# Patient Record
Sex: MALE | Race: WHITE
[De-identification: names, ages, dates, MRNs, and addresses within clinical notes are randomized per-mention and may not be internally consistent; named-entity substitution may affect disease eponyms.]

---

## 2017-07-08 ENCOUNTER — HOSPITAL ENCOUNTER (EMERGENCY)
Dept: HOSPITAL 47 - EC | Age: 57
Discharge: HOME | End: 2017-07-08
Payer: COMMERCIAL

## 2017-07-08 VITALS
TEMPERATURE: 98 F | RESPIRATION RATE: 18 BRPM | DIASTOLIC BLOOD PRESSURE: 87 MMHG | HEART RATE: 92 BPM | SYSTOLIC BLOOD PRESSURE: 143 MMHG

## 2017-07-08 DIAGNOSIS — T78.49XA: Primary | ICD-10-CM

## 2017-07-08 DIAGNOSIS — Z87.891: ICD-10-CM

## 2017-07-08 PROCEDURE — 96372 THER/PROPH/DIAG INJ SC/IM: CPT

## 2017-07-08 PROCEDURE — 99283 EMERGENCY DEPT VISIT LOW MDM: CPT

## 2017-07-08 NOTE — ED
Allergic Reaction HPI





- General


Chief complaint: Allergic Reaction


Stated complaint: Poss Allergic Reaction


Time Seen by Provider: 07/08/17 20:43


Source: patient


Mode of arrival: ambulatory


Limitations: no limitations





- History of Present Illness


Initial Comments: 


56-year-old male presents with rash on bilateral arms back chest and face for 

the last few hours.  Patient states he thinks he got bit on the hand yesterday 

and a lot of itchy bumps all over.  Patient states he was at a wedding today 

had a dressing on the outside but doesn't remember getting bit by mosquitoes.  

Patient denies any recent changes to food or medications.  No recent 

antibiotics.  She denies any change of detergents.  No recent fevers.  Patient 

doesn't feel any nausea headaches or fatigue.  Patient has not taken anything 

for the rash.  Patient denies pain just itchiness.  No shortness of breath or 

trouble swallowing no lip edema or tongue swelling.





MD Complaint: allergic reaction, hives


Exposure: unknown


Symptoms: rash, itching





- Related Data


 Previous Rx's











 Medication  Instructions  Recorded


 


methylPREDNISolone Dose Pack 4 mg PO AS DIRECTED #21 package 07/08/17





[Medrol Dose Pack]  











 Allergies











Allergy/AdvReac Type Severity Reaction Status Date / Time


 


No Known Allergies Allergy   Verified 07/08/17 20:32














Review of Systems


ROS Statement: 


Those systems with pertinent positive or pertinent negative responses have been 

documented in the HPI.





ROS Other: All systems not noted in ROS Statement are negative.


Constitutional: Denies: fever, chills


ENT: Denies: throat pain


Respiratory: Denies: cough


Gastrointestinal: Denies: abdominal pain, nausea, vomiting


Skin: Reports: rash, pruritus


Neurological: Denies: headache, weakness





Past Medical History


Past Medical History: No Reported History


History of Any Multi-Drug Resistant Organisms: None Reported


Past Surgical History: Orthopedic Surgery


Additional Past Surgical History / Comment(s): Left arm - post traumatic 1984


Past Psychological History: No Psychological Hx Reported


Smoking Status: Former smoker


Past Alcohol Use History: Occasional


Past Drug Use History: None Reported





General Exam


Limitations: no limitations


General appearance: alert, in no apparent distress


Head exam: Present: atraumatic, normocephalic, normal inspection


Eye exam: Present: normal appearance, PERRL, EOMI.  Absent: scleral icterus, 

conjunctival injection, periorbital swelling


ENT exam: Present: normal exam


Neck exam: Present: normal inspection.  Absent: tenderness, meningismus, 

lymphadenopathy


Respiratory exam: Present: normal lung sounds bilaterally.  Absent: respiratory 

distress, wheezes, rales, rhonchi, stridor


Cardiovascular Exam: Present: regular rate, normal rhythm, normal heart sounds.

  Absent: systolic murmur, diastolic murmur, rubs, gallop, clicks


Neurological exam: Present: alert, oriented X3, CN II-XII intact


Psychiatric exam: Present: normal affect, normal mood


Skin exam: Present: warm, dry, intact, normal color, rash (erythematous raised 

lesions on bilateral arms and anterior posterior trunk and face. some papules 

noted.  Some urticarial lesions noted.)





Course





 Vital Signs











  07/08/17





  20:29


 


Temperature 98.0 F


 


Pulse Rate 92


 


Respiratory 18





Rate 


 


Blood Pressure 143/87


 


O2 Sat by Pulse 97





Oximetry 














Medical Decision Making





- Medical Decision Making


discussed with patient and family that seems that he's having ALLERGIC reaction 

to some sort of bug bite.  Patient to use hydrocortisone cream twice a day at 

home for one week along with Benadryl every 4-6 hours as needed for itchiness 

and swelling.  Patient takes steroids as directed as well.  Patient to return 

sooner if any problems call 911 if symptoms progress or worsen.  Patient to 

keep cool may use ice packs for itchiness as well.








Disposition


Clinical Impression: 


 Allergic reaction, Allergic reaction to insect sting





Disposition: HOME SELF-CARE


Condition: Good


Instructions:  Insect Bite or Sting (ED), Urticaria (ED)


Prescriptions: 


methylPREDNISolone Dose Pack [Medrol Dose Pack] 4 mg PO AS DIRECTED #21 package


Referrals: 


None,Stated [Primary Care Provider] - 1-2 days


Aden Can MD [REFERRING] - 1-2 days


Time of Disposition: 20:55

## 2019-11-07 ENCOUNTER — HOSPITAL ENCOUNTER (OUTPATIENT)
Dept: HOSPITAL 47 - LABPAT | Age: 59
Discharge: HOME | End: 2019-11-07
Attending: ORTHOPAEDIC SURGERY
Payer: COMMERCIAL

## 2019-11-07 DIAGNOSIS — Z01.812: ICD-10-CM

## 2019-11-07 DIAGNOSIS — Z01.818: Primary | ICD-10-CM

## 2019-11-07 DIAGNOSIS — M17.11: ICD-10-CM

## 2019-11-07 LAB
ALBUMIN SERPL-MCNC: 4.5 G/DL (ref 3.5–5)
ALP SERPL-CCNC: 73 U/L (ref 38–126)
ALT SERPL-CCNC: 48 U/L (ref 21–72)
ANION GAP SERPL CALC-SCNC: 10 MMOL/L
APTT BLD: 24.4 SEC (ref 22–30)
AST SERPL-CCNC: 35 U/L (ref 17–59)
BUN SERPL-SCNC: 25 MG/DL (ref 9–20)
CALCIUM SPEC-MCNC: 9.7 MG/DL (ref 8.4–10.2)
CHLORIDE SERPL-SCNC: 106 MMOL/L (ref 98–107)
CO2 SERPL-SCNC: 25 MMOL/L (ref 22–30)
ERYTHROCYTE [DISTWIDTH] IN BLOOD BY AUTOMATED COUNT: 5.32 M/UL (ref 4.3–5.9)
ERYTHROCYTE [DISTWIDTH] IN BLOOD: 13.8 % (ref 11.5–15.5)
GLUCOSE SERPL-MCNC: 91 MG/DL (ref 74–99)
HCT VFR BLD AUTO: 46 % (ref 39–53)
HGB BLD-MCNC: 15.6 GM/DL (ref 13–17.5)
INR PPP: 0.9 (ref ?–1.2)
MCH RBC QN AUTO: 29.3 PG (ref 25–35)
MCHC RBC AUTO-ENTMCNC: 33.9 G/DL (ref 31–37)
MCV RBC AUTO: 86.5 FL (ref 80–100)
PH UR: 5.5 [PH] (ref 5–8)
PLATELET # BLD AUTO: 228 K/UL (ref 150–450)
POTASSIUM SERPL-SCNC: 4.6 MMOL/L (ref 3.5–5.1)
PROT SERPL-MCNC: 8 G/DL (ref 6.3–8.2)
PT BLD: 9.9 SEC (ref 9–12)
SODIUM SERPL-SCNC: 141 MMOL/L (ref 137–145)
SP GR UR: 1.02 (ref 1–1.03)
UROBILINOGEN UR QL STRIP: <2 MG/DL (ref ?–2)
WBC # BLD AUTO: 5.6 K/UL (ref 3.8–10.6)

## 2019-11-07 PROCEDURE — 85610 PROTHROMBIN TIME: CPT

## 2019-11-07 PROCEDURE — 87070 CULTURE OTHR SPECIMN AEROBIC: CPT

## 2019-11-07 PROCEDURE — 36415 COLL VENOUS BLD VENIPUNCTURE: CPT

## 2019-11-07 PROCEDURE — 81003 URINALYSIS AUTO W/O SCOPE: CPT

## 2019-11-07 PROCEDURE — 85730 THROMBOPLASTIN TIME PARTIAL: CPT

## 2019-11-07 PROCEDURE — 85027 COMPLETE CBC AUTOMATED: CPT

## 2019-11-07 PROCEDURE — 93005 ELECTROCARDIOGRAM TRACING: CPT

## 2019-11-07 PROCEDURE — 80053 COMPREHEN METABOLIC PANEL: CPT

## 2019-11-19 ENCOUNTER — HOSPITAL ENCOUNTER (OUTPATIENT)
Dept: HOSPITAL 47 - OR | Age: 59
LOS: 2 days | Discharge: HOME HEALTH | End: 2019-11-21
Attending: ORTHOPAEDIC SURGERY
Payer: COMMERCIAL

## 2019-11-19 VITALS — BODY MASS INDEX: 29.4 KG/M2

## 2019-11-19 DIAGNOSIS — Z98.41: ICD-10-CM

## 2019-11-19 DIAGNOSIS — Z88.5: ICD-10-CM

## 2019-11-19 DIAGNOSIS — Z96.1: ICD-10-CM

## 2019-11-19 DIAGNOSIS — M25.761: ICD-10-CM

## 2019-11-19 DIAGNOSIS — Z87.891: ICD-10-CM

## 2019-11-19 DIAGNOSIS — I95.81: ICD-10-CM

## 2019-11-19 DIAGNOSIS — Z98.42: ICD-10-CM

## 2019-11-19 DIAGNOSIS — I10: ICD-10-CM

## 2019-11-19 DIAGNOSIS — Z79.1: ICD-10-CM

## 2019-11-19 DIAGNOSIS — M17.11: Primary | ICD-10-CM

## 2019-11-19 DIAGNOSIS — Z79.899: ICD-10-CM

## 2019-11-19 PROCEDURE — 85025 COMPLETE CBC W/AUTO DIFF WBC: CPT

## 2019-11-19 PROCEDURE — 97161 PT EVAL LOW COMPLEX 20 MIN: CPT

## 2019-11-19 PROCEDURE — 73560 X-RAY EXAM OF KNEE 1 OR 2: CPT

## 2019-11-19 PROCEDURE — 27447 TOTAL KNEE ARTHROPLASTY: CPT

## 2019-11-19 PROCEDURE — 88300 SURGICAL PATH GROSS: CPT

## 2019-11-19 PROCEDURE — 64448 NJX AA&/STRD FEM NRV NFS IMG: CPT

## 2019-11-19 PROCEDURE — 76942 ECHO GUIDE FOR BIOPSY: CPT

## 2019-11-19 RX ADMIN — POTASSIUM CHLORIDE SCH MLS: 14.9 INJECTION, SOLUTION INTRAVENOUS at 13:04

## 2019-11-19 RX ADMIN — DOCUSATE SODIUM AND SENNOSIDES SCH: 50; 8.6 TABLET ORAL at 21:15

## 2019-11-19 RX ADMIN — CEFAZOLIN SCH: 330 INJECTION, POWDER, FOR SOLUTION INTRAMUSCULAR; INTRAVENOUS at 18:31

## 2019-11-19 RX ADMIN — ASPIRIN 325 MG ORAL TABLET SCH MG: 325 PILL ORAL at 21:15

## 2019-11-19 NOTE — XR
EXAMINATION TYPE: XR knee limited RT

 

DATE OF EXAM: 11/19/2019

 

COMPARISON: NONE

 

HISTORY: Postop knee surgery

 

TECHNIQUE: 2 views

 

FINDINGS: There is right knee prosthesis. Components are in anatomic position.

 

IMPRESSION: No complicating process seen.

## 2019-11-19 NOTE — P.ANPRN
Procedure Note - Anesthesia





- Nerve Block Performed


  ** Right Adductor Canal Infusion


Time Out Performed: Yes ([id number])


Date of Procedure: 11/19/19


Procedure Start Time: 13:22


Procedure Stop Time: 13:30


Location of Patient: PreOp


Indication: Acute Post-Operative Pain, Dx/Pain Location, Requested by Surgeon


Sedation Type: Sedate with meaningful contact maintained


Preparation: Sterile Prep


Position: Supine


Catheter: Indwelling


Needle Types: Pajunk


Needle Gauge: 21


Ultrasound used to visualize needle placement: Yes


Ultrasound used to observe medication spread: Yes


Injectate: 0.5% Ropivacaine (see comment for volume) (20ml)


Blood Aspirated: No


Pain Paresthesia on Injection Noted: No


Resistance on Injection: Normal


Image Stored and Saved: Yes


Events: Uneventful and Well Tolerated

## 2019-11-19 NOTE — P.OP
Date of Procedure: 11/19/19


Preoperative Diagnosis: 


Severe osteoarthritis right knee


Postoperative Diagnosis: 


Severe osteoarthritis right knee


Procedure(s) Performed: 


Right total knee arthroplasty


Implants: 


Smith and Nephew Journey II CR Oxinium cruciate retaining femoral component size

6, right


Smith & Nephew Journey right nonporous tibial baseplate size 5


Smith & Nephew Journey II, XLPE CR articular insert, size 13 mm, Size 5-6 right


Smith & Nephew Journey BCS resurfacing oval patellar component, 32 mm


All components were cemented using Palacos R bone cement..


The articulation is Oxinium on polyethylene.


Anesthesia: spinal


Surgeon: Brice Leung


Assistant #1: Corina Mc


Estimated Blood Loss (ml): 40


Pathology: other (Bone and cartilage)


Condition: stable


Disposition: PACU


Indications for Procedure: 


After failure of conservative treatment we discussed the surgical and 

nonsurgical treatment options at length.  Patient wishes to proceed with a total

knee arthroplasty.  Complications specific to this procedure were discussed at 

length, including but not limited to infection, bleeding, stiffness, and nerve 

injury.  Patient is aware of all these complications and informed consent was 

obtained


Operative Findings: 


The operative findings are consistent with severe osteoarthritis of the right 

knee


Description of Procedure: 


Patient was seen in the preoperative area consent was reviewed and operative 

site was marked with a skin marker.  An adductor canal pain catheter was placed 

by anesthesia in the preoperative area.  Patient was then brought to the 

operating room and given preoperative antibiotics intravenously. A spinal 

anesthetic was administered by the anesthesia department.  A tourniquet was 

placed on the upper thigh and the lower extremity was prepped and draped in 

usual sterile fashion.  A gram of transexamic acid was given.  A universal 

timeout was then performed which confirmed the patient's name, surgical site, 

ALLERGIES, and consent.





The lower extremity was then exsanguinated and tourniquet was inflated to 250 

mmHg.  A standard and anterior midline approach to the knee was performed.  The 

skin and subcutaneous tissue was dissected down to the patellar tendon.  A 

medial parapatellar arthrotomy was then performed.  The knee was then extended, 

the patellar was everted, and the knee was again flexed.  Anterior horns of both

menisci were excised, and a release was performed to the posterior medial aspect

of the knee.  On gross visual inspection, there was complete loss of articular 

cartilage in the medial and patellofemoral joint spaces.  There was also 

significant cartilage damage in the lateral compartment.  There were multiple 

periarticular osteophytes which were then removed with a Ronguer.  The femoral 

canal was then opened with the appropriate drill, and the intramedullary femoral

cutting guide was then placed and set for 5 of valgus.  The distal femoral 

cutting block was then pinned in place, and the distal femur was then cut.  The 

cutting block was then removed and the cut was checked for flatness.  Next, the 

sizing guide was then placed and set for 3 external rotation based off of the 

epicondylar axis and Whitesides line.  After the femur was sized, the 

appropriate 4-in-1 cutting block was then pinned in place.  The anterior 

condyles were cut without notching.  The posterior and chamfer cuts were 

performed while protecting the collateral ligaments.  The cutting block was then

removed, and the femoral canal was plugged with autologous bone.





Attention was then directed to the tibia.  The remaining ACL was removed with a 

Ronguer, and the tibia was then gently subluxed forward with a large bent knee 

retractor.  Any remaining menisci was excised.  The posterior lateral corner was

cauterized in order to cauterize the lateral geniculate artery.  The extra 

medullary tibial cutting guide was then placed, set for the appropriate 

rotation, slope, and depth of resection.  The proximal tibia cutting guide was t

hen pinned in place.  Proximal tibia was then cut and sized.  Next trials were 

then placed with the appropriate-sized insert.  The knee was able to fully 

extend and flex to 130 and was stable throughout all range of motion.  The knee

was then extended, patella everted.  Patella was then measured, and then using 

an osteotomy guide, the patella was cut at the appropriate level.  The patella 

was then measured and drilled and the patella trial was then placed.  The knee 

was then taken through range of motion with the patella trial and the patella 

tracked normally.  The knee was then extended patella trial was then removed and

the patella was everted.  Knee was then flexed and lug holes were drilled 

through the femoral trial and the femoral trial was then removed.  The tibial 

was then exposed, and the tibial broach guide was then pinned in place after it 

was set for the appropriate rotation to allow for the most coverage without 

overhang.  The tibia was then reamed and broached.





The cut surfaces of bone were then irrigated with pulsatile lavage.  The 

posterior structures were injected with the ropivacaine solution.  The knee was 

also irrigated with Irrisept solution.  The components were then opened, the 

cement was mixed, and the components were then cemented in place.  The cement 

was allowed to harden with the knee in full extension.  While the cement was 

hardening, the remaining soft tissues were then injected with a ropivacaine 

solution, which consisted of 246.25 mg of ropivacaine, 0.5 mg of epinephrine, 30

mg of Toradol, 80 g of clonidine, and 48.45 mL of sterile water, for a total of

100 mL of fluid injected. After the cemented hardened.  The tourniquet was 

released, and hemostasis was obtained.  A second gram of transexamic acid was 

given.  The knee was again irrigated.  The knee was again taken through range of

motion and found to be stable throughout all range of motion of 0-130, and the 

patella tracked normally.  The fascia was then closed with #2 strata fix suture.

 The subcutaneous tissue was closed with 3-0 Vicryl and 3-0 strata fix.  

Dermabond glue was used for the skin and placed with the knee in flexion. The 

patient was placed in a sterile silver dressing.  Patient was then transferred 

to recovery room in stable condition.





The assistant WALTER Abbott was required due the complexity surgery and the 

need for a skilled surgical assistant.  She assisted in positioning, draping, 

retraction, and closure of the wound.

## 2019-11-20 LAB
BASOPHILS # BLD AUTO: 0 K/UL (ref 0–0.2)
BASOPHILS NFR BLD AUTO: 0 %
EOSINOPHIL # BLD AUTO: 0 K/UL (ref 0–0.7)
EOSINOPHIL NFR BLD AUTO: 0 %
ERYTHROCYTE [DISTWIDTH] IN BLOOD BY AUTOMATED COUNT: 4.55 M/UL (ref 4.3–5.9)
ERYTHROCYTE [DISTWIDTH] IN BLOOD: 13.8 % (ref 11.5–15.5)
GLUCOSE BLD-MCNC: 140 MG/DL (ref 75–99)
HCT VFR BLD AUTO: 39.4 % (ref 39–53)
HGB BLD-MCNC: 13.3 GM/DL (ref 13–17.5)
LYMPHOCYTES # SPEC AUTO: 1.1 K/UL (ref 1–4.8)
LYMPHOCYTES NFR SPEC AUTO: 8 %
MCH RBC QN AUTO: 29.3 PG (ref 25–35)
MCHC RBC AUTO-ENTMCNC: 33.8 G/DL (ref 31–37)
MCV RBC AUTO: 86.5 FL (ref 80–100)
MONOCYTES # BLD AUTO: 0.7 K/UL (ref 0–1)
MONOCYTES NFR BLD AUTO: 5 %
NEUTROPHILS # BLD AUTO: 12.2 K/UL (ref 1.3–7.7)
NEUTROPHILS NFR BLD AUTO: 86 %
PLATELET # BLD AUTO: 203 K/UL (ref 150–450)
WBC # BLD AUTO: 14.2 K/UL (ref 3.8–10.6)

## 2019-11-20 RX ADMIN — POTASSIUM CHLORIDE SCH: 14.9 INJECTION, SOLUTION INTRAVENOUS at 20:52

## 2019-11-20 RX ADMIN — HYDROCODONE BITARTRATE AND ACETAMINOPHEN PRN EACH: 5; 325 TABLET ORAL at 05:00

## 2019-11-20 RX ADMIN — HYDROCODONE BITARTRATE AND ACETAMINOPHEN PRN EACH: 5; 325 TABLET ORAL at 19:14

## 2019-11-20 RX ADMIN — MELOXICAM SCH MG: 7.5 TABLET ORAL at 08:42

## 2019-11-20 RX ADMIN — ASPIRIN 325 MG ORAL TABLET SCH MG: 325 PILL ORAL at 20:04

## 2019-11-20 RX ADMIN — HYDROCODONE BITARTRATE AND ACETAMINOPHEN PRN EACH: 5; 325 TABLET ORAL at 11:27

## 2019-11-20 RX ADMIN — ASPIRIN 325 MG ORAL TABLET SCH MG: 325 PILL ORAL at 08:43

## 2019-11-20 RX ADMIN — CEFAZOLIN SCH: 330 INJECTION, POWDER, FOR SOLUTION INTRAMUSCULAR; INTRAVENOUS at 05:57

## 2019-11-20 RX ADMIN — CEFAZOLIN SCH: 330 INJECTION, POWDER, FOR SOLUTION INTRAMUSCULAR; INTRAVENOUS at 19:40

## 2019-11-20 RX ADMIN — POTASSIUM CHLORIDE SCH: 14.9 INJECTION, SOLUTION INTRAVENOUS at 05:57

## 2019-11-20 RX ADMIN — DOCUSATE SODIUM AND SENNOSIDES SCH EACH: 50; 8.6 TABLET ORAL at 20:04

## 2019-11-20 NOTE — P.PN
Progress Note - Text


Progress Note Date: 11/20/19


58-year-old male status post right total knee arthroplasty postop day #1, 

adductor canal catheter day #2.  VAS between a 2-4 out of 10 in severity.  

Patient ambulating without issue.  Incentive spirometry at bedside.  Plan is to 

discharge home today.

## 2019-11-20 NOTE — P.CONS
History of Present Illness





- Reason for Consult


Leukocytosis





- History of Present Illness


58-year-old pleasant male was admitted for right knee arthrodesis x-ray 

underwent surgery patient doesn't have it drained patient did pass gas, patient 

is clinically doing well.  Is well-controlled patient any cough runny nose 

patient denied any dysuria.  Patient doesn't have a Robison catheter at this time.








Review of Systems








REVIEW OF SYSTEMS: 


CONSTITUTIONAL: No fever, no malaise, no fatigue. 


HEENT: No recent visual problems or hearing problems. Denied any sore throat. 


CARDIOVASCULAR: No chest pain, orthopnea, PND, no palpitations, no syncope. 


PULMONARY: No shortness of breath, no cough, no hemoptysis. 


GASTROINTESTINAL: No diarrhea, no nausea, no vomiting, no abdominal pain. 


NEUROLOGICAL: No headaches, no weakness, no numbness. 


HEMATOLOGICAL: Denies any bleeding or petechiae. 


GENITOURINARY: Denies any burning micturition, frequency, or urgency. 


MUSCULOSKELETAL/RHEUMATOLOGICAL: Denies any joint pain, swelling, or any muscle 

pain. 


ENDOCRINE: Denies any polyuria or polydipsia. 





The rest of the 14-point review of systems is negative.











Past Medical History


Past Medical History: Hypertension


Additional Past Medical History / Comment(s): has been on a 30 day trial of rx 

for HTN


History of Any Multi-Drug Resistant Organisms: None Reported


Past Surgical History: Orthopedic Surgery


Additional Past Surgical History / Comment(s): Left arm pins 1984, now removed, 

yamileth cataract with lens implant


Past Anesthesia/Blood Transfusion Reactions: No Reported Reaction


Past Psychological History: No Psychological Hx Reported


Smoking Status: Former smoker


Past Alcohol Use History: Occasional


Additional Past Alcohol Use History / Comment(s): quit smokin 2007, smoked less 

than 1 ppd from age 18


Past Drug Use History: None Reported





- Past Family History


  ** Mother


Family Medical History: Cancer





  ** Father


Family Medical History: Cancer





Medications and Allergies


                                Home Medications











 Medication  Instructions  Recorded  Confirmed  Type


 


Ibuprofen [Motrin] 600 mg PO Q8HR PRN 11/14/19 11/19/19 History


 


amLODIPine [Norvasc] 5 mg PO DAILY 11/14/19 11/14/19 History


 


Aspirin 325 mg PO BID #60 tab 11/20/19  Rx


 


HYDROcodone/APAP 5-325MG [Norco 1 - 2 tab PO Q6HR PRN #56 tab 11/20/19  Rx





5-325]    


 


Sennosides [Senokot] 1 tab PO BID #60 tablet 11/20/19  Rx








                                    Allergies











Allergy/AdvReac Type Severity Reaction Status Date / Time


 


codeine AdvReac  "made me Verified 11/19/19 12:37





   jittery"  














Physical Exam


Vitals: 


                                   Vital Signs











  Temp Pulse Pulse Resp BP Pulse Ox


 


 11/20/19 07:20  97.4 F L   76  17  114/78  94 L


 


 11/20/19 01:10  98.0 F   74  18  114/69  95


 


 11/19/19 19:05    99  18  141/88  95


 


 11/19/19 18:50    104 H  18  156/94  98


 


 11/19/19 18:35    89  18  146/84  98


 


 11/19/19 18:20   89  89  18  159/100  97


 


 11/19/19 18:07    87  18  143/88  96


 


 11/19/19 17:53   84  84  18  150/87  98


 


 11/19/19 17:37   67  67  18  164/93  96


 


 11/19/19 17:22  97.7 F  65  65  18  148/89  97


 


 11/19/19 17:00   61   16  132/75  96


 


 11/19/19 16:45   65   16  119/64  96


 


 11/19/19 16:30   70   16  135/73  96


 


 11/19/19 16:15   62   16  126/68  95


 


 11/19/19 16:00   60   16  125/72  96


 


 11/19/19 15:48  96.8 F L  68   16  123/71  98








                                Intake and Output











 11/19/19 11/20/19 11/20/19





 22:59 06:59 14:59


 


Intake Total 677.5  480


 


Output Total 340 500 


 


Balance 337.5 -500 480


 


Intake:   


 


    


 


  Intake, IV Titration 227.5  





  Amount   


 


    Sodium Chloride 0.9% 1, 227.5  





    000 ml @ 65 mls/hr IV .   





    [id number] [facility] Rx#:[id number]   


 


  Oral 300  480


 


Output:   


 


  Urine 300 500 


 


  Estimated Blood Loss 40  


 


Other:   


 


  Voiding Method   Toilet


 


  # Voids  1 

















PHYSICAL EXAMINATION: 





GENERAL: The patient is alert and oriented x3, not in any acute distress. Well 

developed, well nourished. 


HEENT: Pupils are round and equally reacting to light. EOMI. No scleral icterus.

 No conjunctival pallor. Normocephalic, atraumatic. No pharyngeal erythema. No 

thyromegaly. 


CARDIOVASCULAR: S1 and S2 present. No murmurs, rubs, or gallops. 


PULMONARY: Chest is clear to auscultation, no wheezing or crackles. 


ABDOMEN: Soft, nontender, nondistended, normoactive bowel sounds. No palpable 

organomegaly. 


MUSCULOSKELETAL: No joint swelling or deformity.  Right knee was postsurgical 

packed with some swelling


EXTREMITIES: No cyanosis, clubbing, or pedal edema. 


NEUROLOGICAL: Gross neurological examination did not reveal any focal deficits. 


SKIN: No rashes. 

















Results


CBC & Chem 7: 


                                 11/20/19 07:04





Labs: 


                  Abnormal Lab Results - Last 24 Hours (Table)











  11/20/19 11/20/19 Range/Units





  07:04 13:41 


 


WBC  14.2 H   (3.8-10.6)  k/uL


 


Neutrophils #  12.2 H   (1.3-7.7)  k/uL


 


POC Glucose (mg/dL)   140 H  (75-99)  mg/dL














Assessment and Plan


Plan: 


Leukocytosis: Reactive without any signs or symptoms of infection no further 

intervention is necessary patient can be discharged from medical perspective.


-Hypertension patient is on amlodipine which can be continued upon discharge


-Right knee osteoarthritis status post right knee arthroplasty pain management 

and due to prophylaxis per primary service

## 2019-11-20 NOTE — P.DS
Providers


Expected date of discharge: 11/20/19


Attending physician: 


Brice Leung





Consults: 





                                        





11/19/19 13:40


Consult Physician Routine 


   Consulting Provider: Michelle Regalado


   Consult Reason/Comments: medical management


   Do you want consulting provider notified?: Yes











Primary care physician: 


Juan José Dalybal








- Discharge Diagnosis(es)


(1) Osteoarthritis of right knee


Current Visit: Yes   Status: Acute   





(2) Status post total right knee replacement


Current Visit: Yes   Status: Acute   


Hospital Course: 


This is a 58-year-old male with known history of degenerative arthritis of the 

right knee.  The patient presents for evaluation.  After discussion and conside

ration patient elects to proceed with total knee arthroplasty.  The patient is 

seen preoperatively by Dr. Leung and medically cleared for surgery by their 

primary care physician.





Patient is admitted to [facility] 11/19/2019  for total knee 

arthroplasty.  The procedures performed without complication or sequelae.  The 

patient is doing well postoperatively.  Labs and vital signs are stable on day 

of discharge.





On day of discharge patient's knee incision is healing well.  There is minimal 

erythema.  There is no drainage noted at this time.  There is minimal soft 

tissue swelling to the knee.  Patient has full foot and ankle motion without 

difficulty or pain.  Calf is soft and nontender to palpation.  Neurovascular 

status to the right lower extremity is intact.  Patient is discharged home in 

good condition. Opioid start talking form is reviewed and signed at patient 

beside. Please see med rec for accurate list of home medications.








Plan - Discharge Summary


Discharge Rx Participant: Yes


New Discharge Prescriptions: 


New


   Aspirin 325 mg PO BID #60 tab


   HYDROcodone/APAP 5-325MG [Norco 5-325] 1 - 2 tab PO Q6HR PRN #56 tab


     PRN Reason: Pain


   Sennosides [Senokot] 1 tab PO BID #60 tablet





No Action


   amLODIPine [Norvasc] 5 mg PO DAILY


   Ibuprofen [Motrin] 600 mg PO Q8HR PRN


     PRN Reason: Pain


Discharge Medication List





Ibuprofen [Motrin] 600 mg PO Q8HR PRN 11/14/19 [History]


amLODIPine [Norvasc] 5 mg PO DAILY 11/14/19 [History]


Aspirin 325 mg PO BID #60 tab 11/20/19 [Rx]


HYDROcodone/APAP 5-325MG [Norco 5-325] 1 - 2 tab PO Q6HR PRN #56 tab 11/20/19 

[Rx]


Sennosides [Senokot] 1 tab PO BID #60 tablet 11/20/19 [Rx]








Follow up Appointment(s)/Referral(s): 


Brice Leung DO [Doctor of Osteopathic Medicine] - 2 Weeks


Activity/Diet/Wound Care/Special Instructions: 


Weightbearing as tolerated with a walker.


CPM 5-6h daily.


Leave dressing intact.  May be removed by home care nurse or by patient in 10 

days.


May shower with dressing on.


Recommend use of compression stockings daily for at least 2 weeks during the day

to help prevent swelling and blood clots. May remove at night before sleeping. 


Please follow up with [facility] and call with any questions or graciela

rns, [phone].





Discharge Disposition: HOME WITH HOME HEALTH SERVICES

## 2019-11-21 VITALS — HEART RATE: 87 BPM | TEMPERATURE: 98.1 F | RESPIRATION RATE: 17 BRPM

## 2019-11-21 VITALS — DIASTOLIC BLOOD PRESSURE: 70 MMHG | SYSTOLIC BLOOD PRESSURE: 111 MMHG

## 2019-11-21 RX ADMIN — ASPIRIN 325 MG ORAL TABLET SCH MG: 325 PILL ORAL at 07:33

## 2019-11-21 RX ADMIN — MELOXICAM SCH MG: 7.5 TABLET ORAL at 07:32

## 2019-11-21 RX ADMIN — HYDROCODONE BITARTRATE AND ACETAMINOPHEN PRN EACH: 5; 325 TABLET ORAL at 07:33

## 2019-11-21 RX ADMIN — HYDROCODONE BITARTRATE AND ACETAMINOPHEN PRN EACH: 5; 325 TABLET ORAL at 00:48

## 2019-11-21 NOTE — P.PN
Subjective


Progress Note Date: 11/21/19


This is a 58-year-old male who is status post right total knee arthroplasty.  

This is postoperative day #2 and patient is seen and evaluated at bedside.  

Patient states that yesterday when he would stand up to walk he felt dizzy.  

Patient states that he has not had any dizziness this morning and is trying to 

sit in a chair more versus laying down.  Patient states that his pain is well 

controlled. Patient denies any fever/chills, numbness, weakness, tingling, 

abdominal pain, shortness of breath or chest pain.








Objective





- Vital Signs


Vital signs: 


                                   Vital Signs











Temp  98.1 F   11/21/19 06:57


 


Pulse  87   11/21/19 06:57


 


Resp  17   11/21/19 06:57


 


BP  137/82   11/21/19 06:57


 


Pulse Ox  94 L  11/21/19 06:57








                                 Intake & Output











 11/20/19 11/21/19 11/21/19





 18:59 06:59 18:59


 


Intake Total 1570  


 


Output Total  100 


 


Balance 1570 -100 


 


Intake:   


 


  Intake, IV Titration 50  





  Amount   


 


    ceFAZolin 2 gm In Sodium 50  





    Chloride 0.9% 50 ml @ 100   





    mls/hr IVPB Q8HR [facility] Rx#   





    :[id number]   


 


  Oral 1520  


 


Output:   


 


  Urine  100 


 


Other:   


 


  Voiding Method Toilet Toilet 


 


  # Voids  1 














- Exam


Vital signs are stable.  Patient is in no acute distress and is alert and 

oriented 3.  Calf is soft and nontender to palpation.  Dressing is clean, dry, 

and intact.  Patient has full foot and ankle motion without pain or difficulty. 

Neurovascular status and circulatory status are intact.  








- Labs


CBC & Chem 7: 


                                 11/20/19 07:04





Labs: 


                  Abnormal Lab Results - Last 24 Hours (Table)











  11/20/19 11/20/19 Range/Units





  07:04 13:41 


 


WBC  14.2 H   (3.8-10.6)  k/uL


 


Neutrophils #  12.2 H   (1.3-7.7)  k/uL


 


POC Glucose (mg/dL)   140 H  (75-99)  mg/dL














Assessment and Plan


(1) Osteoarthritis of right knee


Current Visit: Yes   Status: Acute   Code(s): M17.11 - UNILATERAL PRIMARY 

OSTEOARTHRITIS, RIGHT KNEE   SNOMED Code(s): 710659959611630


   





(2) Status post total right knee replacement


Current Visit: Yes   Status: Acute   Code(s): Z96.651 - PRESENCE OF RIGHT 

ARTIFICIAL KNEE JOINT   SNOMED Code(s): 2531437048769


   


Plan: 


#1 Continue with routine postoperative care and pain control, leave dressing in 

place for ten days.


#2 Anticoagulation with aspirin.  


#3 Physical therapy and CPM today. 


#4 Appreciate input from medicine. 


#5 Will continue to monitor blood pressure today.  Patient has been asymptomatic

so far this morning.  Vital signs are stable this morning.


#6 Anticipate discharge home with home care in the next 24-48 hours.

## 2019-11-24 ENCOUNTER — HOSPITAL ENCOUNTER (INPATIENT)
Dept: HOSPITAL 47 - EC | Age: 59
LOS: 2 days | Discharge: HOME HEALTH | DRG: 561 | End: 2019-11-26
Attending: ORTHOPAEDIC SURGERY | Admitting: ORTHOPAEDIC SURGERY
Payer: COMMERCIAL

## 2019-11-24 DIAGNOSIS — I10: ICD-10-CM

## 2019-11-24 DIAGNOSIS — T84.84XA: Primary | ICD-10-CM

## 2019-11-24 DIAGNOSIS — Z88.5: ICD-10-CM

## 2019-11-24 DIAGNOSIS — Z79.82: ICD-10-CM

## 2019-11-24 DIAGNOSIS — Z96.651: ICD-10-CM

## 2019-11-24 DIAGNOSIS — Z87.891: ICD-10-CM

## 2019-11-24 LAB
ALBUMIN SERPL-MCNC: 3.5 G/DL (ref 3.5–5)
ALP SERPL-CCNC: 75 U/L (ref 38–126)
ALT SERPL-CCNC: 57 U/L (ref 21–72)
ANION GAP SERPL CALC-SCNC: 7 MMOL/L
AST SERPL-CCNC: 53 U/L (ref 17–59)
BASOPHILS # BLD AUTO: 0.1 K/UL (ref 0–0.2)
BASOPHILS NFR BLD AUTO: 1 %
BUN SERPL-SCNC: 15 MG/DL (ref 9–20)
CALCIUM SPEC-MCNC: 8.9 MG/DL (ref 8.4–10.2)
CHLORIDE SERPL-SCNC: 104 MMOL/L (ref 98–107)
CO2 SERPL-SCNC: 29 MMOL/L (ref 22–30)
EOSINOPHIL # BLD AUTO: 0.2 K/UL (ref 0–0.7)
EOSINOPHIL NFR BLD AUTO: 3 %
ERYTHROCYTE [DISTWIDTH] IN BLOOD BY AUTOMATED COUNT: 3.67 M/UL (ref 4.3–5.9)
ERYTHROCYTE [DISTWIDTH] IN BLOOD: 13.9 % (ref 11.5–15.5)
GLUCOSE SERPL-MCNC: 104 MG/DL (ref 74–99)
HCT VFR BLD AUTO: 32.1 % (ref 39–53)
HGB BLD-MCNC: 10.7 GM/DL (ref 13–17.5)
LYMPHOCYTES # SPEC AUTO: 1.1 K/UL (ref 1–4.8)
LYMPHOCYTES NFR SPEC AUTO: 15 %
MCH RBC QN AUTO: 29.1 PG (ref 25–35)
MCHC RBC AUTO-ENTMCNC: 33.3 G/DL (ref 31–37)
MCV RBC AUTO: 87.6 FL (ref 80–100)
MONOCYTES # BLD AUTO: 0.4 K/UL (ref 0–1)
MONOCYTES NFR BLD AUTO: 6 %
NEUTROPHILS # BLD AUTO: 5.5 K/UL (ref 1.3–7.7)
NEUTROPHILS NFR BLD AUTO: 74 %
PLATELET # BLD AUTO: 258 K/UL (ref 150–450)
POTASSIUM SERPL-SCNC: 4.3 MMOL/L (ref 3.5–5.1)
PROT SERPL-MCNC: 6.4 G/DL (ref 6.3–8.2)
SODIUM SERPL-SCNC: 140 MMOL/L (ref 137–145)
WBC # BLD AUTO: 7.4 K/UL (ref 3.8–10.6)

## 2019-11-24 PROCEDURE — 87040 BLOOD CULTURE FOR BACTERIA: CPT

## 2019-11-24 PROCEDURE — 83605 ASSAY OF LACTIC ACID: CPT

## 2019-11-24 PROCEDURE — 80053 COMPREHEN METABOLIC PANEL: CPT

## 2019-11-24 PROCEDURE — 99284 EMERGENCY DEPT VISIT MOD MDM: CPT

## 2019-11-24 PROCEDURE — 96365 THER/PROPH/DIAG IV INF INIT: CPT

## 2019-11-24 PROCEDURE — 36415 COLL VENOUS BLD VENIPUNCTURE: CPT

## 2019-11-24 PROCEDURE — 96375 TX/PRO/DX INJ NEW DRUG ADDON: CPT

## 2019-11-24 PROCEDURE — 85025 COMPLETE CBC W/AUTO DIFF WBC: CPT

## 2019-11-24 RX ADMIN — CEFAZOLIN SCH MLS/HR: 330 INJECTION, POWDER, FOR SOLUTION INTRAMUSCULAR; INTRAVENOUS at 19:27

## 2019-11-24 RX ADMIN — KETOROLAC TROMETHAMINE PRN MG: 30 INJECTION, SOLUTION INTRAMUSCULAR at 16:01

## 2019-11-24 RX ADMIN — HYDROCODONE BITARTRATE AND ACETAMINOPHEN PRN EACH: 7.5; 325 TABLET ORAL at 19:26

## 2019-11-24 RX ADMIN — CEFAZOLIN SCH MLS/HR: 330 INJECTION, POWDER, FOR SOLUTION INTRAMUSCULAR; INTRAVENOUS at 11:22

## 2019-11-24 NOTE — ED
Recheck HPI





<Meet Tom - Last Filed: 11/24/19 14:55>





- General


Source: patient


Mode of arrival: wheelchair


Limitations: no limitations





<Diana Zimmerman - Last Filed: 11/24/19 15:21>





- General


Chief Complaint: Recheck/Abnormal Lab/Rx


Stated Complaint: complications from surgery


Time Seen by Provider: 11/24/19 10:26





- History of Present Illness


Initial Comments: 


58-year-old male presenting today for chief complaint of left leg redness 

swelling.  Increasing pain.  Patient states that he had a total knee replacement

on 1119 by Dr. jose c jones.  He states he was discharged on the 21st.  Patient 

states that he had the pain home that was located locally removed on Friday.  

Patient states that over the weekend he had increasing pain and redness and 

swelling.  Patient states the redness now has trouble from the area of the 

medial aspect of the right knee towards his inner groin.  Patient states he is 

not able to weight bear as much to use 2 secondary to discomfort.  Patient 

states his pain medications at home and her longer working.  Denies fever or 

flulike symptoms. Denies chest pain shortness of breath.  Patient denies a 

diffuse redness of the joint aren't purulent drainage remaining review of 

systems negative upon arrival patient appears well and nontoxic.


 (Diana Zimmerman)





- Related Data


                                  Previous Rx's











 Medication  Instructions  Recorded


 


Aspirin 325 mg PO BID #60 tab 11/20/19


 


HYDROcodone/APAP 5-325MG [Norco 1 - 2 tab PO Q6HR PRN #56 tab 11/20/19





5-325]  


 


Sennosides [Senokot] 1 tab PO BID #60 tablet 11/20/19











                                    Allergies











Allergy/AdvReac Type Severity Reaction Status Date / Time


 


codeine AdvReac  "made me Verified 11/24/19 15:15





   jittery"  














Review of Systems


ROS Other: All systems not noted in ROS Statement are negative.





<Meet Tom - Last Filed: 11/24/19 14:55>


ROS Other: All systems not noted in ROS Statement are negative.





<Diana Zimmerman - Last Filed: 11/24/19 15:21>


ROS Statement: 


Those systems with pertinent positive or pertinent negative responses have been 

documented in the HPI.








Past Medical History


Past Medical History: No Reported History


History of Any Multi-Drug Resistant Organisms: None Reported


Past Surgical History: Orthopedic Surgery


Additional Past Surgical History / Comment(s): Left arm - post traumatic 1984, 

RT total knee replacement


Past Psychological History: No Psychological Hx Reported


Smoking Status: Former smoker


Past Alcohol Use History: Occasional


Past Drug Use History: None Reported





<Diana Zimmerman - Last Filed: 11/24/19 15:21>





General Exam


Limitations: no limitations





<Diana Zimmerman - Last Filed: 11/24/19 15:21>





- General Exam Comments


Initial Comments: 


General:  The patient is awake and alert, in no distress, and does not appear 

acutely ill. 


Eye:  Pupils are equal, round and reactive to light, extra-ocular movements are 

intact.  No nystagmus.  There is normal conjunctiva bilaterally.  No signs of 

icterus.  


Ears, nose, mouth and throat:  There are moist mucous membranes and no oral 

lesions. 


Neck:  The neck is supple, there is no tenderness or JVD.  


Cardiovascular:  There is a regular rate and rhythm. No murmur, rub or gallop is

 appreciated.


Respiratory:  Lungs are clear to auscultation, respirations are non-labored, 

breath sounds are equal.  No wheezes, stridor, rales, or rhonchi.


Musculoskeletal:  Upon inspection of the knee there is a midline incision.  

Serosanguineous drainage on the bandage.  No purulent drainage.  No dehiscence. 

 Patient is able to slightly range at the right knee there is swelling and 

bruising consistent recent operative status.  Patient does have significant 

redness that tracks towards the head greater than 15" x 4".  Warm to palpation 

no evidence of abscess Sensation intact. DP pulses equal bilaterally 2+.  No 

calf pain negative Homans.


Neurological:  A&O x 3. CN II-XII intact, There are no obvious motor or sensory 

deficits. Coordination appears grossly intact. Speech is normal.


Skin:  Skin is warm and dry and no rashes or lesions are noted. 


Psychiatric:  Cooperative, appropriate mood & affect, normal judgment.  


 (Diana Zimmerman)





Course





<Meet Tom - Last Filed: 11/24/19 14:55>


                                   Vital Signs











  11/24/19 11/24/19





  10:19 12:21


 


Temperature 97.9 F 


 


Pulse Rate 108 H 103 H


 


Respiratory 18 18





Rate  


 


Blood Pressure 115/72 153/93


 


O2 Sat by Pulse 100 98





Oximetry  














- Reevaluation(s)


Reevaluation #1: 





11/24/19 14:55


PA supervision: I proceeded face-to-face evaluation the patient.  He is postop 

knee replacement.  He does present with complaints of chills as well as pain to 

the medial aspect of the knee with erythema ascending to the groin area.  Lab 

work is unremarkable however the patient's symptoms are discordant.  Patient 

does have erythema with elevated localized temperature to this area as well as 

tenderness palpation ultrasound was negative for DVT.  Attempts are made it any 

bleeding patient has he had been doing postop this is unsuccessful patient be 

admitted I did discuss case with Dr. Sutton.  Ancef every 8 hours IV will be 

started. (Meet Tom)





Medical Decision Making





- Lab Data


Result diagrams: 


                                 11/24/19 11:00





                                 11/24/19 11:00





<Meet Tom - Last Filed: 11/24/19 14:55>





- Lab Data


Result diagrams: 


                                 11/24/19 11:00





                                 11/24/19 11:00





<Diana Zimmerman - Last Filed: 11/24/19 15:21>





- Medical Decision Making


8-year-old male presenting for increasing right knee pain after surgery.  

Physical exam findings consistent with cellulitis.  This does not appear to be 

us a septic joint at this time.  No leukocytosis patient afebrile nontoxic 

appearing however given the extensiveness of this cellulitis with close 

proximity to patient's recent surgical site I feel admission is appropriate.  

Dr. Sutton was consult.  Initially he recommended discharge.  However pain did 

not resolve and he was re-consult and he recommended admission with every 8 

hours Ancef. Dr. Tom evaluated patient agreeable with disposition and care 

plan. Transferred to floor in stable condition. Blood cultures pending.


 (Diana Zimmerman)





- Lab Data


                                   Lab Results











  11/24/19 11/24/19 11/24/19 Range/Units





  11:00 11:00 11:00 


 


WBC  7.4    (3.8-10.6)  k/uL


 


RBC  3.67 L    (4.30-5.90)  m/uL


 


Hgb  10.7 L    (13.0-17.5)  gm/dL


 


Hct  32.1 L    (39.0-53.0)  %


 


MCV  87.6    (80.0-100.0)  fL


 


MCH  29.1    (25.0-35.0)  pg


 


MCHC  33.3    (31.0-37.0)  g/dL


 


RDW  13.9    (11.5-15.5)  %


 


Plt Count  258    (150-450)  k/uL


 


Neutrophils %  74    %


 


Lymphocytes %  15    %


 


Monocytes %  6    %


 


Eosinophils %  3    %


 


Basophils %  1    %


 


Neutrophils #  5.5    (1.3-7.7)  k/uL


 


Lymphocytes #  1.1    (1.0-4.8)  k/uL


 


Monocytes #  0.4    (0-1.0)  k/uL


 


Eosinophils #  0.2    (0-0.7)  k/uL


 


Basophils #  0.1    (0-0.2)  k/uL


 


Sodium   140   (137-145)  mmol/L


 


Potassium   4.3   (3.5-5.1)  mmol/L


 


Chloride   104   ()  mmol/L


 


Carbon Dioxide   29   (22-30)  mmol/L


 


Anion Gap   7   mmol/L


 


BUN   15   (9-20)  mg/dL


 


Creatinine   1.02   (0.66-1.25)  mg/dL


 


Est GFR (CKD-EPI)AfAm   >90   (>60 ml/min/1.73 sqM)  


 


Est GFR (CKD-EPI)NonAf   81   (>60 ml/min/1.73 sqM)  


 


Glucose   104 H   (74-99)  mg/dL


 


Plasma Lactic Acid Will    1.5  (0.7-2.0)  mmol/L


 


Calcium   8.9   (8.4-10.2)  mg/dL


 


Total Bilirubin   0.6   (0.2-1.3)  mg/dL


 


AST   53   (17-59)  U/L


 


ALT   57   (21-72)  U/L


 


Alkaline Phosphatase   75   ()  U/L


 


Total Protein   6.4   (6.3-8.2)  g/dL


 


Albumin   3.5   (3.5-5.0)  g/dL














Disposition





<Meet Tom - Last Filed: 11/24/19 14:55>


Is patient prescribed a controlled substance at d/c from ED?: No


Time of Disposition: 12:11


Decision to Admit Reason: Admit from EC


Decision Date: 11/24/19


Decision Time: 14:55





<Diana Zimmerman - Last Filed: 11/24/19 15:21>


Clinical Impression: 


 Surgical site infection, Cellulitis





Disposition: ADMITTED AS IP TO THIS HOSP


Condition: Stable


Additional Instructions: 


.


Referrals: 


Aden Can MD [Primary Care Provider] - 1-2 days


Brice Leung DO [Doctor of Osteopathic Medicine] - 1-2 days

## 2019-11-24 NOTE — US
EXAMINATION TYPE: US venous doppler duplex LE RT

 

DATE OF EXAM: 11/24/2019 11:36 AM

 

COMPARISON: NONE

 

CLINICAL HISTORY: 58-year-old male r/o blood clot. Right total knee replacement 11/19/19. Pain right 
leg. Edema right ankle

 

SIDE PERFORMED: right  

 

TECHNIQUE:  The lower extremity deep venous system is examined utilizing real time linear array sonog
nevin with graded compression, doppler sonography and color-flow sonography.

 

FINDINGS:

 

VESSELS IMAGED:

External Iliac Vein (EIV)

Common Femoral Vein

Deep Femoral Vein

Greater Saphenous Vein *

Femoral Vein

Popliteal Vein

Small Saphenous Vein *

Proximal Calf Veins

(* superficial vessels)

 

 

 

Right Leg:  No evidence of DVT as visualized

 

 

 

 

IMPRESSION:

No evidence for DVT within the right lower extremity imaged from the groin to the upper calf.

## 2019-11-25 RX ADMIN — KETOROLAC TROMETHAMINE PRN MG: 30 INJECTION, SOLUTION INTRAMUSCULAR at 19:16

## 2019-11-25 RX ADMIN — HYDROCODONE BITARTRATE AND ACETAMINOPHEN PRN EACH: 7.5; 325 TABLET ORAL at 14:27

## 2019-11-25 RX ADMIN — CEFAZOLIN SCH MLS/HR: 330 INJECTION, POWDER, FOR SOLUTION INTRAMUSCULAR; INTRAVENOUS at 19:17

## 2019-11-25 RX ADMIN — CEFAZOLIN SCH MLS/HR: 330 INJECTION, POWDER, FOR SOLUTION INTRAMUSCULAR; INTRAVENOUS at 08:21

## 2019-11-25 RX ADMIN — KETOROLAC TROMETHAMINE PRN MG: 30 INJECTION, SOLUTION INTRAMUSCULAR at 00:09

## 2019-11-25 RX ADMIN — HYDROCODONE BITARTRATE AND ACETAMINOPHEN PRN EACH: 7.5; 325 TABLET ORAL at 21:08

## 2019-11-25 RX ADMIN — KETOROLAC TROMETHAMINE PRN MG: 30 INJECTION, SOLUTION INTRAMUSCULAR at 13:08

## 2019-11-25 RX ADMIN — HYDROCODONE BITARTRATE AND ACETAMINOPHEN PRN EACH: 7.5; 325 TABLET ORAL at 03:16

## 2019-11-25 RX ADMIN — HYDROCODONE BITARTRATE AND ACETAMINOPHEN PRN EACH: 7.5; 325 TABLET ORAL at 08:20

## 2019-11-25 NOTE — P.HPOR
History of Present Illness


H&P Date: 11/25/19


This is a 58-year-old male who was admitted for right knee pain and concern for 

surgical site infection.  Patient presented to the emergency room on 11/24/2019 

for pain and swelling in the right lower extremity.  Patient is status post 

right total knee arthroplasty on 11/19/2019 by  Dr. Brice Leung.  Patient 

states that on 11/24/2019 he developed pain in the right lower extremity and had

difficulty bearing weight on the right leg.  Patient states that his pain med

ication has not been helping.  Patient states that he has been doing his 

physical therapy exercises.  Patient states that he did notice some redness 

around the right knee yesterday, but this has improved.  Patient denies any 

fever or chills, numbness, weakness or tingling.








Review of Systems


See HPI.








Past Medical History


Past Medical History: Hypertension


Additional Past Medical History / Comment(s): recently diagnoses, not currently 

taking medications


History of Any Multi-Drug Resistant Organisms: None Reported


Past Surgical History: Orthopedic Surgery


Additional Past Surgical History / Comment(s): Left arm - post traumatic 1984, 

RT total knee replacement, cataract replacement


Past Psychological History: No Psychological Hx Reported


Smoking Status: Former smoker


Past Alcohol Use History: Occasional


Past Drug Use History: None Reported





- Past Family History


  ** Mother


Family Medical History: Cancer





Medications and Allergies


                                Home Medications











 Medication  Instructions  Recorded  Confirmed  Type


 


Aspirin 325 mg PO BID #60 tab 11/20/19 11/24/19 Rx


 


HYDROcodone/APAP 5-325MG [Norco 1 - 2 tab PO Q6HR PRN #56 tab 11/20/19 11/24/19 

Rx





5-325]    


 


Sennosides [Senokot] 1 tab PO BID #60 tablet 11/20/19 11/24/19 Rx








                                    Allergies











Allergy/AdvReac Type Severity Reaction Status Date / Time


 


codeine AdvReac  "made me Verified 11/24/19 15:15





   jittery"  














Physical Examination


Vital signs are stable.  Patient is in no acute distress and is alert and 

oriented 3. 


Right lower extremity exam:


 Calf is soft and nontender to palpation.  Dressing is clean, dry, and intact.  

There is no surrounding erythema or drainage.  There is moderate swelling of the

 right lower extremity.  Compartments are soft.  Patient has full foot and ankle

 motion without pain or difficulty.  Sensation intact. Neurovascular status and 

circulatory status are intact.  








Results





- Labs


Labs: 


                  Abnormal Lab Results - Last 24 Hours (Table)











  11/24/19 11/24/19 Range/Units





  11:00 11:00 


 


RBC  3.67 L   (4.30-5.90)  m/uL


 


Hgb  10.7 L   (13.0-17.5)  gm/dL


 


Hct  32.1 L   (39.0-53.0)  %


 


Glucose   104 H  (74-99)  mg/dL








                                      H & H











  11/24/19 Range/Units





  11:00 


 


Hgb  10.7 L  (13.0-17.5)  gm/dL


 


Hct  32.1 L  (39.0-53.0)  %











Result Diagrams: 


                                 11/24/19 11:00





                                 11/24/19 11:00





Assessment and Plan


(1) Status post total right knee replacement


Current Visit: No   Status: Acute   Code(s): Z96.651 - PRESENCE OF RIGHT 

ARTIFICIAL KNEE JOINT   SNOMED Code(s): 9935378357504


   


Plan: 


1. Very low suspicion for surgical site infection.  There is no surrounding 

erythema or drainage.  Patient is afebrile and white blood cell count is within 

normal limits. 


2.  Doppler ultrasound is negative for DVT.


3.  Recommended thigh-high ROD hose to the right lower extremity to help with 

swelling.  Patient is to rest, ice and elevate the right lower extremity for 

swelling and pain.


4.  Recommend physical therapy for mobilization today.


5.  Continue pain control.


6.  Planning for discharge home tomorrow if symptoms have improved.

## 2019-11-25 NOTE — XR
EXAMINATION TYPE: XR knee limited RT

 

DATE OF EXAM: 11/25/2019

 

COMPARISON: 11/19/2019

 

HISTORY: Post knee replacement, pain swelling

 

TECHNIQUE: 2 view right knee

 

FINDINGS: No acute or subacute fractures are evident. There is some lucency within the posterior fibu
la on the lateral projection which was present previously. Correlate with location of pain. The right
 knee prosthesis remains in position. No joint effusion is evident.

 

IMPRESSION:

1.  Right knee prosthesis remains in position.

2. Lucency within the posterior fibular head felt to more likely be an old change. This was present o
n the prior examination. Correlate with location of the patient's pain.

## 2019-11-26 VITALS
SYSTOLIC BLOOD PRESSURE: 119 MMHG | TEMPERATURE: 98.3 F | RESPIRATION RATE: 16 BRPM | HEART RATE: 80 BPM | DIASTOLIC BLOOD PRESSURE: 75 MMHG

## 2019-11-26 RX ADMIN — KETOROLAC TROMETHAMINE PRN MG: 30 INJECTION, SOLUTION INTRAMUSCULAR at 08:17

## 2019-11-26 RX ADMIN — CEFAZOLIN SCH: 330 INJECTION, POWDER, FOR SOLUTION INTRAMUSCULAR; INTRAVENOUS at 05:47

## 2019-11-26 RX ADMIN — HYDROCODONE BITARTRATE AND ACETAMINOPHEN PRN EACH: 7.5; 325 TABLET ORAL at 10:33

## 2019-11-26 NOTE — P.DS
Providers


Date of admission: 


11/24/19 14:55





Expected date of discharge: 11/26/19


Attending physician: 


Brice Leung





Primary care physician: 


Juan José Samaniego








- Discharge Diagnosis(es)


(1) Status post total right knee replacement


Current Visit: No   Status: Acute   


Hospital Course: 


This is a 58-year-old male who was admitted to [facility] on 11/24/2019 

for right knee pain and redness via the emergency room.  Patient is status post 

right total knee arthroplasty on 11/19/2019 by Dr. Brice Leung.  During the 

patient's hospital stay the patient's pain has been well-controlled and he has 

been up and walking with physical therapy.  There is very low suspicion for 

surgical site infection.  Patient's white blood cell count is within normal 

limits and patient has been afebrile.


Labs and vital signs are stable on day of discharge.





On day of discharge patient's knee incision is healing well.  There is no 

erythema.  There is no drainage noted at this time.  There is minimal soft 

tissue swelling to the knee.  Patient has full foot and ankle motion without 

difficulty or pain.  Calf is soft and nontender to palpation.  Neurovascular 

status to the right lower extremity is intact.  Patient is discharged home in 

good condition. Please see med rec for accurate list of home medications.





Patient Condition at Discharge: Stable





Plan - Discharge Summary


Discharge Rx Participant: Yes


New Discharge Prescriptions: 


No Action


   Aspirin 325 mg PO BID #60 tab


   HYDROcodone/APAP 5-325MG [Norco 5-325] 1 - 2 tab PO Q6HR PRN #56 tab


     PRN Reason: Pain


   Sennosides [Senokot] 1 tab PO BID #60 tablet


Discharge Medication List





Aspirin 325 mg PO BID #60 tab 11/20/19 [Rx]


HYDROcodone/APAP 5-325MG [Norco 5-325] 1 - 2 tab PO Q6HR PRN #56 tab 11/20/19 

[Rx]


Sennosides [Senokot] 1 tab PO BID #60 tablet 11/20/19 [Rx]








Follow up Appointment(s)/Referral(s): 


Aden Can MD [Primary Care Provider] - 1-2 days


[facility], [NON-STAFF] - 


Brice Leung DO [Doctor of Osteopathic Medicine] - 12/04/19 3:20 pm


Activity/Diet/Wound Care/Special Instructions: 


Weightbearing as tolerated with a walker.


CPM 5-6h daily as tolerated.


Leave dressing intact.  May be removed by home care nurse or by patient 10 days 

postoperatively.


May shower with dressing on.


Recommend use of compression stockings daily for at least 2 weeks during the day

to help prevent swelling and blood clots. May remove at night before sleeping. 


Please follow up with [facility] and call with any questions or 

concerns, [phone].


.


Discharge Disposition: HOME WITH HOME HEALTH SERVICES